# Patient Record
Sex: FEMALE | Race: WHITE
[De-identification: names, ages, dates, MRNs, and addresses within clinical notes are randomized per-mention and may not be internally consistent; named-entity substitution may affect disease eponyms.]

---

## 2020-09-17 ENCOUNTER — HOSPITAL ENCOUNTER (EMERGENCY)
Dept: HOSPITAL 18 - NAV ERS | Age: 20
Discharge: HOME | End: 2020-09-17
Payer: COMMERCIAL

## 2020-09-17 DIAGNOSIS — J06.9: Primary | ICD-10-CM

## 2020-09-17 DIAGNOSIS — Z20.828: ICD-10-CM

## 2020-09-17 DIAGNOSIS — F32.9: ICD-10-CM

## 2020-09-17 DIAGNOSIS — F41.9: ICD-10-CM

## 2020-09-17 DIAGNOSIS — E03.9: ICD-10-CM

## 2020-09-17 DIAGNOSIS — Z87.891: ICD-10-CM

## 2020-09-17 PROCEDURE — 71046 X-RAY EXAM CHEST 2 VIEWS: CPT

## 2020-09-17 PROCEDURE — U0003 INFECTIOUS AGENT DETECTION BY NUCLEIC ACID (DNA OR RNA); SEVERE ACUTE RESPIRATORY SYNDROME CORONAVIRUS 2 (SARS-COV-2) (CORONAVIRUS DISEASE [COVID-19]), AMPLIFIED PROBE TECHNIQUE, MAKING USE OF HIGH THROUGHPUT TECHNOLOGIES AS DESCRIBED BY CMS-2020-01-R: HCPCS

## 2020-09-17 PROCEDURE — 87635 SARS-COV-2 COVID-19 AMP PRB: CPT

## 2020-09-17 NOTE — RAD
2 view chest:

[9/17/2020]



Comparison:None available



HISTORY: Chest pain, allergy symptoms



FINDINGS: Heart and mediastinal contours are grossly unremarkable. No pneumothorax or pleural fluid. 
No focal consolidation or alveolar edema.



IMPRESSION: No acute findings.



Reported By: Lexx Logan 

Electronically Signed:  9/17/2020 8:54 PM